# Patient Record
(demographics unavailable — no encounter records)

---

## 2025-06-04 NOTE — ASSESSMENT
[FreeTextEntry1] : 86yFemale PMH CAD, hyperlipidemia, dementia presents today for HFU of diarrhea and abdominal pain.    #Left sided colitis on CT and flexible sigmoidoscopy, biopsies suggestive of Ulcerative Colitis #Sigmoid diverticulosis  Patient was not able to drink the prep for full colonoscopy, refused to drink or to have NG tube on second attempt, currently patient and family are not interested in having full colonoscopy given these limitations, understanding the risks of missing malignancy  -Will order mesalamine 800 mg QID, side effects discussed  -Discussed she needs enemas at night to heal inflammation  - concerned with pt being able to have the enemas done, as she has dementia  -Given compliance, we will order mesalamine suppositories,  -Will order fecal calprotectin, CRP, CBC - [facility] repeat labs in 6 months      Follow up in 3 months

## 2025-06-04 NOTE — REASON FOR VISIT
[Post Hospitalization] : a post hospitalization visit [Spouse] : spouse [FreeTextEntry1] : NP - HFU - Post Colon

## 2025-06-04 NOTE — PHYSICAL EXAM
[Alert] : alert [No Respiratory Distress] : no respiratory distress [No Acc Muscle Use] : no accessory muscle use [Respiration, Rhythm And Depth] : normal respiratory rhythm and effort [Auscultation Breath Sounds / Voice Sounds] : lungs were clear to auscultation bilaterally [Heart Rate And Rhythm] : heart rate was normal and rhythm regular [Normal S1, S2] : normal S1 and S2 [Murmurs] : no murmurs [Bowel Sounds] : normal bowel sounds [Abdomen Tenderness] : non-tender [No Masses] : no abdominal mass palpated [Abdomen Soft] : soft [] : no hepatosplenomegaly [Oriented To Time, Place, And Person] : oriented to person, place, and time [No Acute Distress] : no acute distress [No CVA Tenderness] : no CVA  tenderness